# Patient Record
Sex: FEMALE | Race: WHITE | NOT HISPANIC OR LATINO | Employment: OTHER | URBAN - METROPOLITAN AREA
[De-identification: names, ages, dates, MRNs, and addresses within clinical notes are randomized per-mention and may not be internally consistent; named-entity substitution may affect disease eponyms.]

---

## 2017-01-12 ENCOUNTER — IMPORTED ENCOUNTER (OUTPATIENT)
Dept: URBAN - METROPOLITAN AREA CLINIC 43 | Facility: CLINIC | Age: 77
End: 2017-01-12

## 2017-01-12 PROBLEM — H25.13: Noted: 2017-01-12

## 2017-01-12 PROBLEM — H16.223: Noted: 2017-01-12

## 2017-01-12 PROBLEM — H40.013: Noted: 2017-01-12

## 2018-01-18 ENCOUNTER — IMPORTED ENCOUNTER (OUTPATIENT)
Dept: URBAN - METROPOLITAN AREA CLINIC 43 | Facility: CLINIC | Age: 78
End: 2018-01-18

## 2018-01-18 PROBLEM — H40.013: Noted: 2018-01-18

## 2018-01-18 PROBLEM — H25.13: Noted: 2018-01-18

## 2018-01-18 PROBLEM — H43.813: Noted: 2018-01-18

## 2018-01-18 PROBLEM — H35.363: Noted: 2018-01-18

## 2018-11-20 ENCOUNTER — IMPORTED ENCOUNTER (OUTPATIENT)
Dept: URBAN - METROPOLITAN AREA CLINIC 43 | Facility: CLINIC | Age: 78
End: 2018-11-20

## 2018-11-20 PROBLEM — H25.13: Noted: 2018-11-20

## 2018-11-20 PROBLEM — H35.363: Noted: 2018-11-20

## 2018-11-20 PROBLEM — H40.013: Noted: 2018-11-20

## 2018-12-10 ENCOUNTER — IMPORTED ENCOUNTER (OUTPATIENT)
Dept: URBAN - METROPOLITAN AREA CLINIC 43 | Facility: CLINIC | Age: 78
End: 2018-12-10

## 2018-12-10 PROBLEM — H43.813: Noted: 2018-12-10

## 2018-12-10 PROBLEM — H25.13: Noted: 2018-12-10

## 2018-12-10 PROBLEM — H25.013: Noted: 2018-12-10

## 2018-12-10 PROBLEM — H18.51: Noted: 2018-12-10

## 2018-12-10 PROBLEM — H35.363: Noted: 2018-12-10

## 2018-12-20 ENCOUNTER — IMPORTED ENCOUNTER (OUTPATIENT)
Dept: URBAN - METROPOLITAN AREA CLINIC 43 | Facility: CLINIC | Age: 78
End: 2018-12-20

## 2018-12-20 PROBLEM — Z96.1: Noted: 2018-12-20

## 2018-12-26 ENCOUNTER — IMPORTED ENCOUNTER (OUTPATIENT)
Dept: URBAN - METROPOLITAN AREA CLINIC 43 | Facility: CLINIC | Age: 78
End: 2018-12-26

## 2018-12-26 PROBLEM — H25.012: Noted: 2018-12-26

## 2018-12-26 PROBLEM — Z96.1: Noted: 2018-12-26

## 2019-01-10 ENCOUNTER — IMPORTED ENCOUNTER (OUTPATIENT)
Dept: URBAN - METROPOLITAN AREA CLINIC 43 | Facility: CLINIC | Age: 79
End: 2019-01-10

## 2019-01-10 PROBLEM — Z96.1: Noted: 2019-01-10

## 2019-01-15 ENCOUNTER — IMPORTED ENCOUNTER (OUTPATIENT)
Dept: URBAN - METROPOLITAN AREA CLINIC 43 | Facility: CLINIC | Age: 79
End: 2019-01-15

## 2019-01-15 PROBLEM — Z96.1: Noted: 2019-01-15

## 2019-01-15 PROBLEM — H35.363: Noted: 2019-01-15

## 2019-01-15 PROBLEM — H35.3131: Noted: 2019-01-15

## 2019-02-05 ENCOUNTER — IMPORTED ENCOUNTER (OUTPATIENT)
Dept: URBAN - METROPOLITAN AREA CLINIC 43 | Facility: CLINIC | Age: 79
End: 2019-02-05

## 2019-02-05 PROBLEM — Z96.1: Noted: 2019-02-05

## 2019-02-05 PROBLEM — H43.813: Noted: 2019-02-05

## 2019-03-06 ENCOUNTER — IMPORTED ENCOUNTER (OUTPATIENT)
Dept: URBAN - METROPOLITAN AREA CLINIC 43 | Facility: CLINIC | Age: 79
End: 2019-03-06

## 2019-03-06 PROBLEM — H16.223: Noted: 2019-03-06

## 2019-03-07 ENCOUNTER — IMPORTED ENCOUNTER (OUTPATIENT)
Dept: URBAN - METROPOLITAN AREA CLINIC 43 | Facility: CLINIC | Age: 79
End: 2019-03-07

## 2019-03-29 ENCOUNTER — IMPORTED ENCOUNTER (OUTPATIENT)
Dept: URBAN - METROPOLITAN AREA CLINIC 43 | Facility: CLINIC | Age: 79
End: 2019-03-29

## 2019-03-29 PROBLEM — H18.413: Noted: 2019-03-29

## 2019-03-29 PROBLEM — H16.223: Noted: 2019-03-29

## 2019-07-18 ENCOUNTER — IMPORTED ENCOUNTER (OUTPATIENT)
Dept: URBAN - METROPOLITAN AREA CLINIC 43 | Facility: CLINIC | Age: 79
End: 2019-07-18

## 2019-07-18 PROBLEM — H10.45: Noted: 2019-07-18

## 2019-07-18 PROBLEM — H16.223: Noted: 2019-07-18

## 2019-07-18 PROBLEM — INACTIVE: Noted: 2019-07-18

## 2020-04-18 ASSESSMENT — VISUAL ACUITY
OS_SC: 20/25-2
OS_SC: 20/60
OD_SC: 20/40 -1
OD_SC: 20/20
OS_CC: 20/30
OD_CC: 20/30 -2
OS_SC: 20/20-2
OS_CC: 20/25
OS_SC: 20/25
OD_CC: 20/25 +1
OS_CC: 20/30
OS_SC: J10
OS_SC: 20/25 +2
OS_OTHER: 20/400.
OS_SC: 20/40-2
OD_OTHER: <20/400.
OS_SC: 20/20-2
OD_SC: 20/20-2
OS_CC: 20/25
OS_OTHER: <20/400.
OD_SC: 20/25
OS_SC: 20/60
OD_SC: J16
OS_CC: 20/25 -1
OS_CC: 20/25+2
OD_SC: J16
OD_CC: 20/40
OD_SC: 20/20-1
OD_SC: 20/30 -2
OD_CC: 20/25 -1
OS_CC: 20/40
OD_CC: 20/40
OD_SC: 20/25
OD_SC: J5
OD_SC: 20/25
OD_CC: J1+
OD_SC: 20/20
OD_CC: 20/25+2
OD_OTHER: 20/400.
OS_SC: J3
OD_SC: 20/20-
OS_OTHER: <20/400.
OD_OTHER: <20/400.
OS_SC: J10
OS_CC: J1+
OS_SC: 20/60 -1

## 2020-04-18 ASSESSMENT — TONOMETRY
OS_IOP_MMHG: 14.0
OD_IOP_MMHG: 12.0
OD_IOP_MMHG: 15.0
OD_IOP_MMHG: 16.0
OS_IOP_MMHG: 14.0
OD_IOP_MMHG: 11.0
OS_IOP_MMHG: 12.0
OD_IOP_MMHG: 16.0
OS_IOP_MMHG: 12.0
OS_IOP_MMHG: 13.0
OD_IOP_MMHG: 12.0
OD_IOP_MMHG: 13.0
OD_IOP_MMHG: 14.0
OD_IOP_MMHG: 12.0
OS_IOP_MMHG: 12.0
OS_IOP_MMHG: 13.0
OS_IOP_MMHG: 15.0
OD_IOP_MMHG: 11.0
OS_IOP_MMHG: 13.0
OD_IOP_MMHG: 12.0
OD_IOP_MMHG: 16.0
OS_IOP_MMHG: 12.0
OS_IOP_MMHG: 12.0

## 2020-04-18 ASSESSMENT — KERATOMETRY
OS_K2POWER_DIOPTERS: 42.25
OD_K1POWER_DIOPTERS: 43.5
OD_K2POWER_DIOPTERS: 42.75
OS_AXISANGLE2_DEGREES: 115
OD_AXISANGLE2_DEGREES: 80
OS_AXISANGLE2_DEGREES: 95
OS_K1POWER_DIOPTERS: 43
OD_K1POWER_DIOPTERS: 43.5
OD_K1POWER_DIOPTERS: 43.5
OD_K2POWER_DIOPTERS: 42.75
OD_AXISANGLE_DEGREES: 170
OS_K2POWER_DIOPTERS: 42.25
OS_K2POWER_DIOPTERS: 42.25
OD_AXISANGLE_DEGREES: 180
OS_AXISANGLE_DEGREES: 30
OD_K2POWER_DIOPTERS: 43.25
OD_AXISANGLE2_DEGREES: 79
OD_K2POWER_DIOPTERS: 43
OS_K1POWER_DIOPTERS: 43
OS_AXISANGLE_DEGREES: 25
OD_AXISANGLE_DEGREES: 165
OD_AXISANGLE_DEGREES: 169
OS_AXISANGLE2_DEGREES: 120
OS_AXISANGLE_DEGREES: 5
OD_AXISANGLE2_DEGREES: 90
OD_K1POWER_DIOPTERS: 43.75
OD_AXISANGLE2_DEGREES: 75
OS_K1POWER_DIOPTERS: 42.75

## 2022-03-14 ENCOUNTER — COMPREHENSIVE EXAM (OUTPATIENT)
Dept: URBAN - METROPOLITAN AREA CLINIC 32 | Facility: CLINIC | Age: 82
End: 2022-03-14

## 2022-03-14 DIAGNOSIS — H16.223: ICD-10-CM

## 2022-03-14 DIAGNOSIS — H26.493: ICD-10-CM

## 2022-03-14 PROCEDURE — 99214 OFFICE O/P EST MOD 30 MIN: CPT

## 2022-03-14 PROCEDURE — 92015 DETERMINE REFRACTIVE STATE: CPT

## 2022-03-14 ASSESSMENT — KERATOMETRY
OS_AXISANGLE2_DEGREES: 5
OD_AXISANGLE_DEGREES: 70
OS_AXISANGLE_DEGREES: 95
OD_AXISANGLE2_DEGREES: 160
OS_K1POWER_DIOPTERS: 43.00
OD_K2POWER_DIOPTERS: 42.75
OS_K2POWER_DIOPTERS: 42.50
OD_K1POWER_DIOPTERS: 43.50

## 2022-03-14 ASSESSMENT — VISUAL ACUITY
OD_CC: J1
OD_SC: 20/25
OS_SC: 20/25-2
OS_CC: J1

## 2022-03-14 ASSESSMENT — TONOMETRY
OD_IOP_MMHG: 12
OS_IOP_MMHG: 12

## 2022-06-04 ENCOUNTER — TELEPHONE ENCOUNTER (OUTPATIENT)
Dept: URBAN - METROPOLITAN AREA CLINIC 68 | Facility: CLINIC | Age: 82
End: 2022-06-04

## 2022-06-04 RX ORDER — ALPRAZOLAM 0.25 MG
XANAX( 0.25MG ORAL 1 TWICE A DAY ) INACTIVE -HX ENTRY TABLET ORAL TWICE A DAY
OUTPATIENT
Start: 2018-05-10

## 2022-06-04 RX ORDER — ESOMEPRAZOLE MAGNESIUM 20 MG/1
CAPSULE, DELAYED RELEASE ORAL
Qty: 60 | Refills: 60 | OUTPATIENT
Start: 2018-05-10 | End: 2019-05-07

## 2022-06-04 RX ORDER — ESCITALOPRAM 5 MG/1
ESCITALOPRAM OXALATE( 5MG ORAL  DAILY ) INACTIVE -HX ENTRY TABLET, FILM COATED ORAL DAILY
OUTPATIENT
Start: 2019-05-07

## 2022-06-04 RX ORDER — CLONAZEPAM 0.5 MG/1
CLONAZEPAM( 0.5MG ORAL  1-2 DAY ) INACTIVE -HX ENTRY TABLET ORAL
OUTPATIENT
Start: 2019-05-07

## 2022-06-05 ENCOUNTER — TELEPHONE ENCOUNTER (OUTPATIENT)
Dept: URBAN - METROPOLITAN AREA CLINIC 68 | Facility: CLINIC | Age: 82
End: 2022-06-05

## 2022-06-05 RX ORDER — LORAZEPAM 0.5 MG/1
LORAZEPAM( 0.5MG ORAL  DAILY ) ACTIVE -HX ENTRY TABLET ORAL DAILY
Status: ACTIVE | COMMUNITY
Start: 2019-05-28

## 2022-06-05 RX ORDER — OMEPRAZOLE 40 MG/1
CAPSULE, DELAYED RELEASE PELLETS ORAL
Qty: 30 | Refills: 30 | Status: ACTIVE | COMMUNITY
Start: 2019-05-28

## 2022-06-25 ENCOUNTER — TELEPHONE ENCOUNTER (OUTPATIENT)
Age: 82
End: 2022-06-25

## 2022-06-25 RX ORDER — SODIUM SULFATE, POTASSIUM SULFATE, MAGNESIUM SULFATE 17.5; 3.13; 1.6 G/ML; G/ML; G/ML
SOLUTION, CONCENTRATE ORAL AS DIRECTED
Qty: 1 | Refills: 0 | OUTPATIENT
Start: 2017-03-02 | End: 2017-03-03

## 2022-06-25 RX ORDER — CLONAZEPAM 0.5 MG/1
CLONAZEPAM( 0.5MG ORAL  1-2 DAY ) INACTIVE -HX ENTRY TABLET ORAL
OUTPATIENT
Start: 2019-05-07

## 2022-06-25 RX ORDER — ALPRAZOLAM 0.25 MG
XANAX( 0.25MG ORAL 1 TWICE A DAY ) INACTIVE -HX ENTRY TABLET ORAL TWICE A DAY
OUTPATIENT
Start: 2018-05-10

## 2022-06-25 RX ORDER — ESOMEPRAZOLE MAGNESIUM 20 MG/1
CAPSULE, DELAYED RELEASE ORAL
Qty: 60 | Refills: 60 | OUTPATIENT
Start: 2018-05-10 | End: 2019-05-07

## 2022-06-25 RX ORDER — ESCITALOPRAM 5 MG/1
ESCITALOPRAM OXALATE( 5MG ORAL  DAILY ) INACTIVE -HX ENTRY TABLET, FILM COATED ORAL DAILY
OUTPATIENT
Start: 2019-05-07

## 2022-06-25 RX ORDER — FAMOTIDINE 10 MG
TABLET ORAL DAILY
Qty: 90 | Refills: 0 | OUTPATIENT
Start: 2018-05-10 | End: 2018-08-08

## 2022-06-26 ENCOUNTER — TELEPHONE ENCOUNTER (OUTPATIENT)
Age: 82
End: 2022-06-26

## 2022-06-26 RX ORDER — LORAZEPAM 0.5 MG/1
LORAZEPAM( 0.5MG ORAL  DAILY ) ACTIVE -HX ENTRY TABLET ORAL DAILY
Status: ACTIVE | COMMUNITY
Start: 2019-05-28

## 2022-06-26 RX ORDER — FAMOTIDINE 10 MG
ZANTAC( 150MG ORAL  1-2 DAY ) ACTIVE -HX ENTRY TABLET ORAL
Status: ACTIVE | COMMUNITY
Start: 2019-05-28

## 2022-06-26 RX ORDER — OMEPRAZOLE 40 MG/1
CAPSULE, DELAYED RELEASE ORAL
Qty: 30 | Refills: 30 | Status: ACTIVE | COMMUNITY
Start: 2019-05-28

## 2023-04-06 ENCOUNTER — COMPREHENSIVE EXAM (OUTPATIENT)
Dept: URBAN - METROPOLITAN AREA CLINIC 32 | Facility: CLINIC | Age: 83
End: 2023-04-06

## 2023-04-06 DIAGNOSIS — H35.363: ICD-10-CM

## 2023-04-06 DIAGNOSIS — H35.3131: ICD-10-CM

## 2023-04-06 DIAGNOSIS — H04.123: ICD-10-CM

## 2023-04-06 DIAGNOSIS — H43.813: ICD-10-CM

## 2023-04-06 DIAGNOSIS — H26.493: ICD-10-CM

## 2023-04-06 PROCEDURE — 92015 DETERMINE REFRACTIVE STATE: CPT

## 2023-04-06 PROCEDURE — 99214 OFFICE O/P EST MOD 30 MIN: CPT

## 2023-04-06 PROCEDURE — 92134 CPTRZ OPH DX IMG PST SGM RTA: CPT

## 2023-04-06 ASSESSMENT — KERATOMETRY
OS_K2POWER_DIOPTERS: 42.50
OD_K2POWER_DIOPTERS: 42.75
OS_AXISANGLE_DEGREES: 95
OS_AXISANGLE2_DEGREES: 5
OD_AXISANGLE_DEGREES: 70
OD_K1POWER_DIOPTERS: 43.50
OS_K1POWER_DIOPTERS: 43.00
OD_AXISANGLE2_DEGREES: 160

## 2023-04-06 ASSESSMENT — VISUAL ACUITY
OS_CC: J3
OD_SC: 20/30-2
OS_SC: 20/30-1
OD_CC: J7

## 2023-04-06 ASSESSMENT — TONOMETRY
OD_IOP_MMHG: 13
OS_IOP_MMHG: 14

## 2024-02-15 ENCOUNTER — OV NP (OUTPATIENT)
Dept: URBAN - METROPOLITAN AREA CLINIC 68 | Facility: CLINIC | Age: 84
End: 2024-02-15
Payer: MEDICARE

## 2024-02-15 VITALS — WEIGHT: 145 LBS | HEIGHT: 65 IN | BODY MASS INDEX: 24.16 KG/M2

## 2024-02-15 DIAGNOSIS — R10.13 DYSPEPSIA: ICD-10-CM

## 2024-02-15 DIAGNOSIS — Z86.010 HISTORY OF COLON POLYPS: ICD-10-CM

## 2024-02-15 DIAGNOSIS — K59.09 CHRONIC CONSTIPATION: ICD-10-CM

## 2024-02-15 DIAGNOSIS — K29.50 CHRONIC GASTRITIS WITHOUT BLEEDING, UNSPECIFIED GASTRITIS TYPE: ICD-10-CM

## 2024-02-15 PROCEDURE — 99204 OFFICE O/P NEW MOD 45 MIN: CPT | Performed by: INTERNAL MEDICINE

## 2024-02-15 RX ORDER — CLONAZEPAM 0.5 MG/1
1 TABLET TABLET ORAL ONCE A DAY
Status: ACTIVE | COMMUNITY

## 2024-02-15 RX ORDER — FAMOTIDINE 10 MG/1
1 TABLET AS NEEDED TABLET, FILM COATED ORAL TWICE A DAY
Status: ACTIVE | COMMUNITY

## 2024-02-15 RX ORDER — LORAZEPAM 0.5 MG/1
LORAZEPAM( 0.5MG ORAL  DAILY ) ACTIVE -HX ENTRY TABLET ORAL DAILY
Status: ON HOLD | COMMUNITY
Start: 2019-05-28

## 2024-02-15 RX ORDER — OMEPRAZOLE 40 MG/1
CAPSULE, DELAYED RELEASE ORAL
Qty: 30 | Refills: 30 | Status: ON HOLD | COMMUNITY
Start: 2019-05-28

## 2024-02-15 NOTE — HPI-TODAY'S VISIT:
Patient evaluated due to chronic constipation and dyspepsia. Referred having worsening chronic constipation problems.   Referred increase of amount of stress/anxiety. 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 Referred is using Pepcid. Also referred having problems with binge eating.  Denies nausea, vomits, dysphagia, odynophagia, heartburn, abdominal pain, diarrhea,  GI bleeding or weight loss

## 2024-02-29 ENCOUNTER — LAB (OUTPATIENT)
Dept: URBAN - METROPOLITAN AREA CLINIC 68 | Facility: CLINIC | Age: 84
End: 2024-02-29

## 2024-03-06 ENCOUNTER — EGD (OUTPATIENT)
Dept: URBAN - METROPOLITAN AREA SURGERY CENTER 12 | Facility: SURGERY CENTER | Age: 84
End: 2024-03-06
Payer: MEDICARE

## 2024-03-06 ENCOUNTER — LAB (OUTPATIENT)
Dept: URBAN - METROPOLITAN AREA CLINIC 4 | Facility: CLINIC | Age: 84
End: 2024-03-06
Payer: MEDICARE

## 2024-03-06 DIAGNOSIS — K44.9 DIAPHRAGMATIC HERNIA WITHOUT OBSTRUCTION OR GANGRENE: ICD-10-CM

## 2024-03-06 DIAGNOSIS — K29.70 GASTRITIS WITHOUT BLEEDING, UNSPECIFIED CHRONICITY, UNSPECIFIED GASTRITIS TYPE: ICD-10-CM

## 2024-03-06 DIAGNOSIS — K22.89 OTHER SPECIFIED DISEASE OF ESOPHAGUS: ICD-10-CM

## 2024-03-06 DIAGNOSIS — K31.89 OTHER DISEASES OF STOMACH AND DUODENUM: ICD-10-CM

## 2024-03-06 PROCEDURE — 43239 EGD BIOPSY SINGLE/MULTIPLE: CPT | Performed by: INTERNAL MEDICINE

## 2024-03-06 PROCEDURE — 88305 TISSUE EXAM BY PATHOLOGIST: CPT | Performed by: PATHOLOGY

## 2024-03-06 PROCEDURE — 88312 SPECIAL STAINS GROUP 1: CPT | Performed by: PATHOLOGY

## 2024-03-06 PROCEDURE — 88342 IMHCHEM/IMCYTCHM 1ST ANTB: CPT | Performed by: PATHOLOGY

## 2024-03-06 RX ORDER — LORAZEPAM 0.5 MG/1
LORAZEPAM( 0.5MG ORAL  DAILY ) ACTIVE -HX ENTRY TABLET ORAL DAILY
Status: ON HOLD | COMMUNITY
Start: 2019-05-28

## 2024-03-06 RX ORDER — OMEPRAZOLE 40 MG/1
CAPSULE, DELAYED RELEASE ORAL
Qty: 30 | Refills: 30 | Status: ON HOLD | COMMUNITY
Start: 2019-05-28

## 2024-03-06 RX ORDER — FAMOTIDINE 10 MG/1
1 TABLET AS NEEDED TABLET, FILM COATED ORAL TWICE A DAY
Status: ACTIVE | COMMUNITY

## 2024-03-06 RX ORDER — CLONAZEPAM 0.5 MG/1
1 TABLET TABLET ORAL ONCE A DAY
Status: ACTIVE | COMMUNITY

## 2024-03-20 ENCOUNTER — OV EP (OUTPATIENT)
Dept: URBAN - METROPOLITAN AREA CLINIC 68 | Facility: CLINIC | Age: 84
End: 2024-03-20
Payer: MEDICARE

## 2024-03-20 VITALS
HEART RATE: 67 BPM | HEIGHT: 65 IN | SYSTOLIC BLOOD PRESSURE: 124 MMHG | WEIGHT: 145 LBS | DIASTOLIC BLOOD PRESSURE: 80 MMHG | OXYGEN SATURATION: 96 % | BODY MASS INDEX: 24.16 KG/M2

## 2024-03-20 DIAGNOSIS — K59.09 CHRONIC CONSTIPATION: ICD-10-CM

## 2024-03-20 DIAGNOSIS — Z86.010 HISTORY OF COLON POLYPS: ICD-10-CM

## 2024-03-20 DIAGNOSIS — K29.50 CHRONIC GASTRITIS WITHOUT BLEEDING, UNSPECIFIED GASTRITIS TYPE: ICD-10-CM

## 2024-03-20 PROBLEM — 116289008: Status: ACTIVE | Noted: 2024-03-20

## 2024-03-20 PROCEDURE — 99213 OFFICE O/P EST LOW 20 MIN: CPT

## 2024-03-20 RX ORDER — FAMOTIDINE 10 MG/1
1 TABLET AS NEEDED TABLET, FILM COATED ORAL TWICE A DAY
Status: ACTIVE | COMMUNITY

## 2024-03-20 RX ORDER — OMEPRAZOLE 40 MG/1
CAPSULE, DELAYED RELEASE ORAL
Qty: 30 | Refills: 30 | Status: ON HOLD | COMMUNITY
Start: 2019-05-28

## 2024-03-20 RX ORDER — CLONAZEPAM 0.5 MG/1
1 TABLET TABLET ORAL ONCE A DAY
Status: ACTIVE | COMMUNITY

## 2024-03-20 RX ORDER — LORAZEPAM 0.5 MG/1
LORAZEPAM( 0.5MG ORAL  DAILY ) ACTIVE -HX ENTRY TABLET ORAL DAILY
Status: ON HOLD | COMMUNITY
Start: 2019-05-28

## 2024-03-20 NOTE — HPI-TODAY'S VISIT:
Patient presents for follow-up s/p EGD. Refers hx of chronic constipation currently well managed with magnesium qhs. Refers non-specific "stomach problems" described as chest tightness.  Refers pepcid prn with relief. Denies nausea, vomiting, dysphagia, odynophagia, heartburn, abdominal pain, diarrhea,  GI bleeding or weight loss.

## 2024-05-02 ENCOUNTER — COMPREHENSIVE EXAM (OUTPATIENT)
Dept: URBAN - METROPOLITAN AREA CLINIC 32 | Facility: CLINIC | Age: 84
End: 2024-05-02

## 2024-05-02 DIAGNOSIS — H35.3131: ICD-10-CM

## 2024-05-02 DIAGNOSIS — H43.813: ICD-10-CM

## 2024-05-02 DIAGNOSIS — H04.123: ICD-10-CM

## 2024-05-02 DIAGNOSIS — H26.493: ICD-10-CM

## 2024-05-02 PROCEDURE — 99214 OFFICE O/P EST MOD 30 MIN: CPT

## 2024-05-02 PROCEDURE — 92134 CPTRZ OPH DX IMG PST SGM RTA: CPT

## 2024-05-02 ASSESSMENT — KERATOMETRY
OS_AXISANGLE2_DEGREES: 5
OD_K1POWER_DIOPTERS: 43.50
OD_K2POWER_DIOPTERS: 42.75
OD_AXISANGLE2_DEGREES: 160
OS_AXISANGLE_DEGREES: 95
OS_K1POWER_DIOPTERS: 43.00
OS_K2POWER_DIOPTERS: 42.50
OD_AXISANGLE_DEGREES: 70

## 2024-05-02 ASSESSMENT — VISUAL ACUITY
OS_GLARE: 20/50
OD_SC: 20/40
OS_SC: 20/40
OS_CC: 20/40
OD_CC: J2
OS_CC: J2
OD_CC: 20/50
OD_GLARE: 20/60

## 2024-05-02 ASSESSMENT — TONOMETRY
OD_IOP_MMHG: 14
OS_IOP_MMHG: 10

## 2024-07-25 ENCOUNTER — FOLLOW UP (OUTPATIENT)
Dept: URBAN - METROPOLITAN AREA CLINIC 32 | Facility: CLINIC | Age: 84
End: 2024-07-25

## 2024-07-25 DIAGNOSIS — H04.123: ICD-10-CM

## 2024-07-25 DIAGNOSIS — H26.493: ICD-10-CM

## 2024-07-25 PROCEDURE — 99213 OFFICE O/P EST LOW 20 MIN: CPT

## 2024-07-25 ASSESSMENT — VISUAL ACUITY
OD_CC: 20/50+1
OD_GLARE: 20/60
OS_GLARE: 20/60
OS_CC: 20/40

## 2024-07-25 ASSESSMENT — KERATOMETRY
OD_K1POWER_DIOPTERS: 43.50
OS_K2POWER_DIOPTERS: 42.50
OS_K1POWER_DIOPTERS: 43.00
OD_K2POWER_DIOPTERS: 42.75
OS_AXISANGLE_DEGREES: 95
OD_AXISANGLE_DEGREES: 70
OS_AXISANGLE2_DEGREES: 5
OD_AXISANGLE2_DEGREES: 160

## 2024-07-25 ASSESSMENT — TONOMETRY
OD_IOP_MMHG: 13
OS_IOP_MMHG: 13

## 2024-08-26 ENCOUNTER — SURGERY/PROCEDURE (OUTPATIENT)
Facility: LOCATION | Age: 84
End: 2024-08-26

## 2024-08-26 DIAGNOSIS — H26.492: ICD-10-CM

## 2024-08-26 PROCEDURE — 66821 AFTER CATARACT LASER SURGERY: CPT

## 2024-08-31 ASSESSMENT — KERATOMETRY
OS_K1POWER_DIOPTERS: 43.00
OD_K1POWER_DIOPTERS: 43.50
OD_AXISANGLE_DEGREES: 70
OD_K2POWER_DIOPTERS: 42.75
OD_AXISANGLE2_DEGREES: 160
OS_K2POWER_DIOPTERS: 42.50
OS_AXISANGLE2_DEGREES: 5
OS_AXISANGLE_DEGREES: 95

## 2024-09-03 ENCOUNTER — POST-OP (OUTPATIENT)
Dept: URBAN - METROPOLITAN AREA CLINIC 32 | Facility: CLINIC | Age: 84
End: 2024-09-03

## 2024-09-03 DIAGNOSIS — Z98.890: ICD-10-CM

## 2024-09-03 PROCEDURE — 99024 POSTOP FOLLOW-UP VISIT: CPT

## 2024-09-16 ENCOUNTER — SURGERY/PROCEDURE (OUTPATIENT)
Facility: LOCATION | Age: 84
End: 2024-09-16

## 2024-09-16 DIAGNOSIS — H26.492: ICD-10-CM

## 2024-09-16 PROCEDURE — 66821 AFTER CATARACT LASER SURGERY: CPT | Mod: 78,LT

## 2024-09-30 ENCOUNTER — POST-OP (OUTPATIENT)
Dept: URBAN - METROPOLITAN AREA CLINIC 32 | Facility: CLINIC | Age: 84
End: 2024-09-30

## 2024-09-30 DIAGNOSIS — Z98.890: ICD-10-CM

## 2024-09-30 PROCEDURE — 99024 POSTOP FOLLOW-UP VISIT: CPT

## 2024-12-09 ENCOUNTER — SURGERY/PROCEDURE (OUTPATIENT)
Age: 84
End: 2024-12-09

## 2024-12-09 DIAGNOSIS — H26.491: ICD-10-CM

## 2024-12-09 PROCEDURE — 66821 AFTER CATARACT LASER SURGERY: CPT | Mod: 79,RT

## 2025-01-20 ENCOUNTER — FOLLOW UP (OUTPATIENT)
Age: 85
End: 2025-01-20

## 2025-01-20 DIAGNOSIS — Z98.890: ICD-10-CM

## 2025-01-20 PROCEDURE — 99024 POSTOP FOLLOW-UP VISIT: CPT

## 2025-02-19 ENCOUNTER — OFFICE VISIT (OUTPATIENT)
Dept: URBAN - METROPOLITAN AREA CLINIC 68 | Facility: CLINIC | Age: 85
End: 2025-02-19

## 2025-02-20 ENCOUNTER — FOLLOW UP (OUTPATIENT)
Age: 85
End: 2025-02-20

## 2025-02-20 DIAGNOSIS — H35.3131: ICD-10-CM

## 2025-02-20 DIAGNOSIS — H04.123: ICD-10-CM

## 2025-02-20 PROCEDURE — 92134 CPTRZ OPH DX IMG PST SGM RTA: CPT

## 2025-02-20 PROCEDURE — 99213 OFFICE O/P EST LOW 20 MIN: CPT

## 2025-06-17 ENCOUNTER — COMPREHENSIVE EXAM (OUTPATIENT)
Age: 85
End: 2025-06-17

## 2025-06-17 DIAGNOSIS — H01.026: ICD-10-CM

## 2025-06-17 DIAGNOSIS — H35.3131: ICD-10-CM

## 2025-06-17 DIAGNOSIS — H01.023: ICD-10-CM

## 2025-06-17 DIAGNOSIS — H04.123: ICD-10-CM

## 2025-06-17 DIAGNOSIS — H43.813: ICD-10-CM

## 2025-06-17 PROCEDURE — 99214 OFFICE O/P EST MOD 30 MIN: CPT
